# Patient Record
(demographics unavailable — no encounter records)

---

## 2025-01-24 NOTE — HISTORY OF PRESENT ILLNESS
[FreeTextEntry1] : Beatriz Marquez is a 27 year old female who presents for follow up on hypothyroidism.   PMHx: Hashimoto's hypothyroidism, ADD, OCD, scoliosis, iron deficiency anemia FMHx: Type 2 DM (father), (Hashimoto's) paternal aunt, grandfather PSHx: NKA   Diagnosed with hypothyroidism in January 2024 w/TSH 6.10  After diagnosis, pt briefly took levothyroxine for one month but discontinued after experiencing symptoms of anxiety (though in reflecting today, was not sure if anxiety had been related to psych medications -- taking Prozac/Lamictal). TSH 6.3 in April 2024 with positive TPO antibodies. Plan had been to monitor and repeat labs in 6 months  Had COVID in October 2024 On repeat labs in November 2024, TSH increased to 12.4  Dr. Posey had recommended to start levothyroxine 25mcg daily x 2 weeks, then increase to levothyroxine 50mcg daily She has taken levothryoxine 25mcg daily, did not increase to 50mcg daily. Has probably forgetten about 4-5 doses total. Sometimes eats after taking it Says she was in ER for psych reasons 3 weeks ago and TSH 7.7  Current regimen: Levothyroxine 25mcg daily Tolerating -- no physical symptoms of hyperthyroidism, denies anxiety. Remains on Prozac/Lamictal but thinks medications will be adjusted soon   Labs drawn yesterday (1/23/25): TSH 8.49, FT4 1.1  Symptoms: + cold intolerance -- possibly related to anemia Denies constipation, fatigue, hair loss, dry skin  Denies compressive symptoms including difficulty swallowing, difficulty breathing. Occasional cracking in voice   Recent A1c 5.6% in November 2024 Diet -- trying to eat fewer fried foods, sugary foods Exercise -- not as much as she'd like

## 2025-01-24 NOTE — ASSESSMENT
[FreeTextEntry1] : Hashimoto's hypothyroidism, diagnosed January 2024 TSH had been <7 on labs from April 2024, then increased to 12.4 in November 2024 at which time she started levothyroxine 25mcg daily (plan was to increase to 50mcg daily after 2 weeks but she did not) Has taken Levothyroxine 25mcg daily for 8 weeks  1/23/25: TSH 8.49, FT4 1.1 Tolerating medication well, denies symptoms of hyper- and hypo-thyroidism w/exception of cold intolerance but thinks may be r/t anemia  Plan -- Increase levothyroxine to 50mcg daily and take on empty stomach, alone with only plain water, at least 30-60 minutes before other food/medications. -- Follow up in 8-12 weeks (early April) to reassess labs

## 2025-01-24 NOTE — PHYSICAL EXAM
[Alert] : alert [No Acute Distress] : no acute distress [EOMI] : extra ocular movement intact [Normal Hearing] : hearing was normal [No Respiratory Distress] : no respiratory distress [No Accessory Muscle Use] : no accessory muscle use [Normal Rate and Effort] : normal respiratory rate and effort [Clear to Auscultation] : lungs were clear to auscultation bilaterally [Normal S1, S2] : normal S1 and S2 [Normal Rate] : heart rate was normal [No Edema] : no peripheral edema [Normal Bowel Sounds] : normal bowel sounds [Not Tender] : non-tender [Not Distended] : not distended [Soft] : abdomen soft [Spine Straight] : spine straight [Kyphosis] : no kyphosis present [No Stigmata of Cushings Syndrome] : no stigmata of Cushings Syndrome [Normal Gait] : normal gait [No Joint Swelling] : no joint swelling seen [Oriented x3] : oriented to person, place, and time [Normal Insight/Judgement] : insight and judgment were intact [de-identified] : Thyroid slightly enlarged bilaterally, non-tender to palpation

## 2025-03-03 NOTE — ASSESSMENT
[FreeTextEntry1] : Beatriz Cheatham is a 27 year old female referred for iron deficiency anemia.  Plan: 1.  CARMITA --etiology of CARMITA not obvious.  she does not have symptoms for blood loss or digestive issues to suggest Celiac/malabsorption.  ? related to diet?   --she declines to take oral supplementation due to concerns about interacting w/ her psychiatric medications.  She was advised that if the oral supplement is taken separately from the medication, it should not cause problems, but she strongly prefers parenteral iron if her CARMITA is persistent.   --s/p Feraheme 510mg x 2 doses (3/29/24, 3/22/24), fatigue has improved after IV iron.  --labs reviewed today, CBC/ferritin/ Iron TIBC all normal.  she has been feeling great.   --if CARMITA recurs, will refer her to GI.   RTC 6 months labs - CBC, Ferritin, Iron/TIBC

## 2025-03-03 NOTE — HISTORY OF PRESENT ILLNESS
[de-identified] : Beatriz Cheatham is a 27 year old female referred by Dr Tesfaye for anemia  She had labs 1/13/24 that showed iron deficiency anemia.  hemoglobin 10.2, MCV 73, ferritin 2.  Reluctant to take PO iron - concerned about interference w/ her medications for mood disorder.  In the past she has had drug interactions w/ these particular medications and it has caused flare in her mood symptoms.  She reports fatigue.  Denies menorrhagia, hematochezia, melena, dysphagia.  denies difficulties w/ digestion, post-prandial abdominal pain, diarrhea.  She does not have any concerns for celiac disease.  No recent pregnancies, major surgeries or traumas where she could have experienced blood loss.  weight has been stable. Until recently had a very restricted diet without much animal protein.  Now more of a pescatarian.  PMH,  PSH reviewed and updated in the chart.  + autoimmune disease (alopecia, Hashimoto's) FMH - denies blood diseases, cancer. SH - used to work for [employer].  denies smoking, regular ETOH [de-identified] : Here for follow up.  she received feraheme x 2 doses in March 2024 w/o problems, not taking any iron supplement,  Fatigue has improved after the infusion. she started eating meat, now feeling great. No any complaints voiced. Denies SOB, chest pain, fever, dizziness, pica, or blood in urine or stools.

## 2025-03-05 NOTE — HISTORY OF PRESENT ILLNESS
[de-identified] : Beatriz Cheatham is a 27 year old female referred by Dr Tesfaye for anemia  She had labs 1/13/24 that showed iron deficiency anemia.  hemoglobin 10.2, MCV 73, ferritin 2.  Reluctant to take PO iron - concerned about interference w/ her medications for mood disorder.  In the past she has had drug interactions w/ these particular medications and it has caused flare in her mood symptoms.  She reports fatigue.  Denies menorrhagia, hematochezia, melena, dysphagia.  denies difficulties w/ digestion, post-prandial abdominal pain, diarrhea.  She does not have any concerns for celiac disease.  No recent pregnancies, major surgeries or traumas where she could have experienced blood loss.  weight has been stable. Until recently had a very restricted diet without much animal protein.  Now more of a pescatarian.  PMH,  PSH reviewed and updated in the chart.  + autoimmune disease (alopecia, Hashimoto's) FMH - denies blood diseases, cancer. SH - used to work for [employer].  denies smoking, regular ETOH [de-identified] : Here for follow up.  she received feraheme x 2 doses in March 2024 w/o problems, not taking any iron supplement,  Fatigue has improved after the infusion. she started eating meat, now feeling great. No any complaints voiced. Denies SOB, chest pain, fever, dizziness, pica, or blood in urine or stools.

## 2025-03-05 NOTE — HISTORY OF PRESENT ILLNESS
[de-identified] : Beatriz Cheatham is a 27 year old female referred by Dr Tesfaye for anemia  She had labs 1/13/24 that showed iron deficiency anemia.  hemoglobin 10.2, MCV 73, ferritin 2.  Reluctant to take PO iron - concerned about interference w/ her medications for mood disorder.  In the past she has had drug interactions w/ these particular medications and it has caused flare in her mood symptoms.  She reports fatigue.  Denies menorrhagia, hematochezia, melena, dysphagia.  denies difficulties w/ digestion, post-prandial abdominal pain, diarrhea.  She does not have any concerns for celiac disease.  No recent pregnancies, major surgeries or traumas where she could have experienced blood loss.  weight has been stable. Until recently had a very restricted diet without much animal protein.  Now more of a pescatarian.  PMH,  PSH reviewed and updated in the chart.  + autoimmune disease (alopecia, Hashimoto's) FMH - denies blood diseases, cancer. SH - used to work for [employer].  denies smoking, regular ETOH [de-identified] : Here for follow up.  she received feraheme x 2 doses in March 2024 w/o problems, not taking any iron supplement,  Fatigue has improved after the infusion. she started eating meat, now feeling great. No any complaints voiced. Denies SOB, chest pain, fever, dizziness, pica, or blood in urine or stools.

## 2025-04-11 NOTE — PHYSICAL EXAM
[Alert] : alert [No Acute Distress] : no acute distress [EOMI] : extra ocular movement intact [Normal Hearing] : hearing was normal [No Respiratory Distress] : no respiratory distress [No Accessory Muscle Use] : no accessory muscle use [Normal Rate and Effort] : normal respiratory rate and effort [Not Distended] : not distended [Spine Straight] : spine straight [Kyphosis] : no kyphosis present [No Stigmata of Cushings Syndrome] : no stigmata of Cushings Syndrome [Normal Gait] : normal gait [Oriented x3] : oriented to person, place, and time [Normal Insight/Judgement] : insight and judgment were intact

## 2025-04-11 NOTE — ASSESSMENT
[FreeTextEntry1] : 1. Hashimoto's hypothyroidism, diagnosed January 2024 Currently taking Levothyroxine 50mcg daily (x last 10 weeks) Last labs (4/4/25) TSH 2.76, FT4 1.3 Tolerating medication well.  Appears clinically euthyroid. No plans for pregnancy -- discussed that we would want TSH a little lower if she became pregnant. We discussed changing dose now to aim for TSH goal closer to first trimester range but Beatriz declined.  Plan -- Continue levothyroxine 50mcg daily and take on empty stomach, alone with only plain water, at least 30-60 minutes before other food/medications. -- Advised close follow up if she becomes pregnant for dose adjustment to meet TSH goals. -- Follow up in 6 months

## 2025-04-11 NOTE — DATA REVIEWED
[FreeTextEntry1] :   4/4/25-- TSH 2.76, FT4 1.3  1/23/25 -- TSH 8.49, FT4 1.1  11/18/24-- TSH 12.4  4/24/24 TPO Ab 422 (<34) Tg Ab 32.5 (<40) TSH 6.3 FT4 1.0

## 2025-04-11 NOTE — HISTORY OF PRESENT ILLNESS
[FreeTextEntry1] : Beatriz Marquez is a 27 year old female who presents for follow up on hypothyroidism.   PMHx: Hashimoto's hypothyroidism, ADD, OCD, scoliosis, iron deficiency anemia FMHx: Type 2 DM (father), (Hashimoto's) paternal aunt, grandfather NKA  Diagnosed with hypothyroidism in January 2024 w/TSH 6.10  + TPO Ab in January, April 2024; + Tg Ab in January 2024 Took levothyroxine around this time for 1 month -- self-discontinued d/t feelings of anxiety (though admits this could have been r/t psych meds)  On repeat labs in November 2024, TSH increased to 12.4, after which Beatriz took Levothyroxine 25mcg daily x 8 weeks, tolerated well. Dose increased to Levothyroxine 50mcg daily in January 2025. She is taking medication on empty stomach, alone with only plain water, at least 30-60 minutes before other food/medications.  Still tolerating levothyroxine well. Denies symptoms of anxiety as previously experienced. Endorses mild cold intolerance, occasional fatigue, some dry skin. Denies weight gain. Denies compressive symptoms including difficulty swallowing, difficulty breathing.   Recent labs: TSH 2.76, FT4 1.3 (4/4/25)  Recent A1c 5.6% in November 2024 Diet -- trying to eat fewer fried foods, sugary foods Exercise -- not as much as she'd like

## 2025-04-11 NOTE — REVIEW OF SYSTEMS
[Fatigue] : fatigue [Recent Weight Gain (___ Lbs)] : no recent weight gain [Recent Weight Loss (___ Lbs)] : no recent weight loss [Dysphagia] : no dysphagia [Dysphonia] : no dysphonia [Cold Intolerance] : cold intolerance [Negative] : Neurological